# Patient Record
Sex: MALE | Race: WHITE | NOT HISPANIC OR LATINO
[De-identification: names, ages, dates, MRNs, and addresses within clinical notes are randomized per-mention and may not be internally consistent; named-entity substitution may affect disease eponyms.]

---

## 2020-12-29 PROBLEM — Z00.00 ENCOUNTER FOR PREVENTIVE HEALTH EXAMINATION: Status: ACTIVE | Noted: 2020-12-29

## 2020-12-31 ENCOUNTER — APPOINTMENT (OUTPATIENT)
Dept: PEDIATRIC SURGERY | Facility: CLINIC | Age: 20
End: 2020-12-31
Payer: COMMERCIAL

## 2020-12-31 VITALS
HEART RATE: 64 BPM | OXYGEN SATURATION: 99 % | BODY MASS INDEX: 21.69 KG/M2 | SYSTOLIC BLOOD PRESSURE: 133 MMHG | HEIGHT: 70.47 IN | DIASTOLIC BLOOD PRESSURE: 72 MMHG | WEIGHT: 153.22 LBS | TEMPERATURE: 98.9 F

## 2020-12-31 DIAGNOSIS — M95.4 ACQUIRED DEFORMITY OF CHEST AND RIB: ICD-10-CM

## 2020-12-31 PROCEDURE — 99072 ADDL SUPL MATRL&STAF TM PHE: CPT

## 2020-12-31 PROCEDURE — 99243 OFF/OP CNSLTJ NEW/EST LOW 30: CPT

## 2021-01-27 NOTE — ADDENDUM
[FreeTextEntry1] : Documented by Laila Monson acting as a scribe for Dr. Patel on 12/31/2020 .\par \par All medical record entries made by the Scribe were at my, Dr. Patel, direction and personally dictated by me on 12/31/2020 . I have reviewed the chart and agree that the record accurately reflects my personal performance of the history, physical exam, assessment and plan. I have also personally directed, reviewed, and agree with the discharge instructions.\par

## 2021-01-27 NOTE — HISTORY OF PRESENT ILLNESS
[FreeTextEntry1] : Amrit is a 20 year old male here today to be evaluated for a chest wall deformity. He started working out in March and noticed his left pectoral muscle is significantly more developed than his right. No differences in strength on either side. He does not have any chest pain or discomfort. He says since March, he started to notice that he was short of breath when he ran, but says this was around the same time he contracted Covid. He is otherwise healthy.

## 2021-01-27 NOTE — CONSULT LETTER
Relevant Problems   PULMONARY   (+) COPD (chronic obstructive pulmonary disease) (HCC)      NEURO   (+) Cervicogenic headache      CARDIAC   (+) Lone atrial fibrillation (HCC)      GI   (+) GERD (gastroesophageal reflux disease)      Other   (+) Psoriatic arthritis ([facility])       Physical Exam    Airway   Mallampati: III  TM distance: <3 FB  Neck ROM: full       Cardiovascular   Rhythm: regular  Rate: normal     Dental   (+) lower dentures, upper dentures         Pulmonary   Breath sounds clear to auscultation     Abdominal    Neurological - normal exam                 Anesthesia Plan    ASA 3   ASA physical status 3 criteria: COPD    Plan - general       Airway plan will be ETT        Induction: intravenous          Informed Consent:    Anesthetic plan and risks discussed with patient.         [Dear  ___] : Dear  [unfilled], [Consult Letter:] : I had the pleasure of evaluating your patient, [unfilled]. [Please see my note below.] : Please see my note below. [Consult Closing:] : Thank you very much for allowing me to participate in the care of this patient.  If you have any questions, please do not hesitate to contact me. [Sincerely,] : Sincerely, [FreeTextEntry2] : Jessie Russell  [FreeTextEntry3] : Amrit Patel MD\par  for Perioperative Services\par Division of Pediatric General, Thoracic and Endoscopic Surgery\par [facility]

## 2021-01-27 NOTE — ASSESSMENT
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 There is no role for surgical intervention. Amrit can follow up with me on an as needed basis. All questions were answered.

## 2021-01-27 NOTE — REASON FOR VISIT
[Initial - Scheduled] : an initial, scheduled visit with concerns of [Chest wall deformity] : chest wall deformity [Patient] : patient [Mother] : mother [FreeTextEntry4] : Jessie Russell

## 2021-01-27 NOTE — PHYSICAL EXAM
[NL] : grossly intact [FreeTextEntry4] : deficiency of musculature on right pectoralis region [TextBox_73] : Spine is straight.